# Patient Record
(demographics unavailable — no encounter records)

---

## 2024-10-08 NOTE — CONSULT LETTER
[FreeTextEntry1] : Dear Dr. Squires,   I had the pleasure of seeing Audra today. Below is my note regarding the office visit today.    Thank you so very much for allowing me to participate in Audra's care. Please don't hesitate to call me should any questions or issues arise .  Sincerely, Rohit Bonilla MD, FACS, FSPU Chief, Pediatric Urology Professor of Urology and Pediatrics [facility] [employer] President, American U[employer] Association - [place] Section Past-President, Societies for Pediatric Urology

## 2024-10-08 NOTE — DATA REVIEWED
[FreeTextEntry1] : ACC: [id number]     EXAM:  XR KUB 1 VIEW   ORDERED BY: DUDLEY LEWIS  PROCEDURE DATE:  09/13/2024  INTERPRETATION:  CLINICAL INFORMATION: Constipation  EXAM: Single frontal view of the abdomen.  COMPARISON: No available imaging for comparison.  FINDINGS: Nonobstructive bowel gas pattern. Moderate colonic stool burden There is no evidence of intraperitoneal free air. The visualized osseous structures demonstrate no acute pathology.  IMPRESSION: Nonobstructive bowel gas pattern with moderate colonic stool burden.  --- End of Report ---  TORRES HOLDER MD; Resident Radiologist This document has been electronically signed. CLIFFORD CAUSEY MD; Attending Radiologist This document has been electronically signed. Sep 13 2024  6:53AM

## 2024-10-08 NOTE — PHYSICAL EXAM
[1] : 1 [Labial adhesions] : no labial adhesions [Introital erythema] : no introital erythema [TextBox_92] : Parents served as chaperone for exam. Small urethral prolapse present on exam, otherwise no masses.

## 2024-10-08 NOTE — DATA REVIEWED
[FreeTextEntry1] : ACC: [id number]     EXAM:  XR KUB 1 VIEW   ORDERED BY: DUDLEY LEWIS  PROCEDURE DATE:  09/13/2024  INTERPRETATION:  CLINICAL INFORMATION: Constipation  EXAM: Single frontal view of the abdomen.  COMPARISON: No available imaging for comparison.  FINDINGS: Nonobstructive bowel gas pattern. Moderate colonic stool burden There is no evidence of intraperitoneal free air. The visualized osseous structures demonstrate no acute pathology.  IMPRESSION: Nonobstructive bowel gas pattern with moderate colonic stool burden.  --- End of Report ---  TORRES HOLDER MD; Resident Radiologist This document has been electronically signed. CLIFFORD CAUSEY MD; Attending Radiologist This document has been electronically signed. Sep 13 2024  6:53AM

## 2024-10-08 NOTE — CONSULT LETTER
[FreeTextEntry1] : Dear Dr. Squires,   I had the pleasure of seeing Audra today. Below is my note regarding the office visit today.    Thank you so very much for allowing me to participate in Audra's care. Please don't hesitate to call me should any questions or issues arise .  Sincerely, Rohit Bonilla MD, FACS, FSPU Chief, Pediatric Urology Professor of Urology and Pediatrics [facility] [employer] President, American [employer] Association - [place] Section Past-President, Societies for Pediatric Urology

## 2024-10-08 NOTE — HISTORY OF PRESENT ILLNESS
[TextBox_4] : Audra is a 2 y.o. female with h/o reactive airway disease being seen today for hospital follow up for urethral prolapse. [place] [facility] [place] and a urethral bulge per mom on 9/12/2024.  KUB at that time was normal. RVP (+) for HMPV. Diagnosed with urethral bulge. Was discharged home on Keflex prophylaxis and premarin. Also seen by outside urologist per parental report, records not available for review, and was prescribed an additional cream (unknown per parents). Since discharge, parents have been completing sitz baths and note that the bulge has gotten significant smaller and less bothersome. Denies h/o UTI or interval infections. No other voiding complaints.  Early development, Audra was born FT without complications. Discharged home with parents. Met all early developmental milestones. H/o RAD. No other significant pmh. Family history significant for maternal grandmother with T1DM.

## 2024-10-08 NOTE — ASSESSMENT
[FreeTextEntry1] : Audra has a small urethral prolapse that is improving with daily sitz baths. To continue with sitz baths BID and will follow up for reexam in one month. Parents expressed understanding ad all questions answered to parents satisfaction.

## 2024-10-08 NOTE — HISTORY OF PRESENT ILLNESS
[TextBox_4] : Audra is a 2 y.o. female with h/o reactive airway disease being seen today for hospital follow up for urethral prolapse. [place] [facility] [place] and a urethral bulge per mom on 9/12/2024.  KUB at that time was normal. RVP (+) for HMPV. Diagnosed with urethral bulge. Was discharged home on Keflex prophylaxis and premarin. Also seen by outside urologist per parental report, records not available for review, and was prescribed an additional cream (unknown per parents). Since discharge, parents have been completing sitz baths and note that the bulge has gotten significant smaller and less bothersome. Denies h/o UTI or interval infections. No other voiding complaints.  Early development, Audra was born FT without complications. Discharged home with parents. Met all early developmental milestones. H/o RAD. No other significant pmh. Family history significant for maternal grandmother with T1DM.

## 2024-10-08 NOTE — REASON FOR VISIT
[Initial Consultation] : an initial consultation [Parents] : parents [TextBox_50] : urethral prolapse

## 2024-11-11 NOTE — PHYSICAL EXAM
[Labial adhesions] : no labial adhesions [Introital erythema] : no introital erythema [1] : 1 [TextBox_92] : Mother served as chaperone for exam. Small urethral prolapse, otherwise no masses.

## 2024-11-11 NOTE — CONSULT LETTER
[FreeTextEntry1] : Dear Dr. Winkler,   I had the pleasure of seeing Audra for follow up today. Below is my note regarding the office visit today.    Thank you so very much for allowing me to participate in Audra's care. Please don't hesitate to call me should any questions or issues arise .  Sincerely, Rohit Bonilla MD, FACS, [facility] Chief, Pediatric Urology Professor of Urology and Pediatrics [facility] [employer] President, American U[employer] Association - [place] Section Past-President, Societies for Pediatric Urology

## 2024-11-11 NOTE — CONSULT LETTER
[FreeTextEntry1] : Dear Dr. Winkler,   I had the pleasure of seeing Audra for follow up today. Below is my note regarding the office visit today.    Thank you so very much for allowing me to participate in Audra's care. Please don't hesitate to call me should any questions or issues arise .  Sincerely, Rohit Bonilla MD, FACS, [facility] Chief, Pediatric Urology Professor of Urology and Pediatrics [facility] [employer] President, American U[employer] Association - [place] Section Past-President, Societies for Pediatric Urology

## 2024-11-11 NOTE — HISTORY OF PRESENT ILLNESS
[TextBox_4] : Audra is a 2 y.o. female with h/o reactive airway disease being seen today for follow up for urethral prolapse. [place] [facility] [place] with hematuria and a urethral bulge per mom on 9/12/2024. KUB at that time was normal. RVP (+) for HMPV. Diagnosed with urethral prolapse. Was discharged home on Keflex prophylaxis and premarin and triamcinolone.  Also seen by outside urologist per parental report, records not available for review. Last seen in office 10/8/2024 where BID sitz baths were recommended with 4 week follow up. No change since last follow up. No UTI, interval infections or other voiding concerns.

## 2024-11-11 NOTE — HISTORY OF PRESENT ILLNESS
[TextBox_4] : Audra is a 2 y.o. female with h/o reactive airway disease being seen today for follow up for urethral prolapse. Presented to [facility] [place] ED with hematuria and a urethral bulge per mom on 9/12/2024. KUB at that time was normal. RVP (+) for HMPV. Diagnosed with urethral prolapse. Was discharged home on Keflex prophylaxis and premarin and triamcinolone.  Also seen by outside urologist per parental report, records not available for review. Last seen in office 10/8/2024 where BID sitz baths were recommended with 4 week follow up. No change since last follow up. No UTI, interval infections or other voiding concerns.

## 2024-11-11 NOTE — ASSESSMENT
[FreeTextEntry1] : Audra has a small and unchanged urethral prolapse. No change to plan, will continue BID sitz baths for another month and follow up in office at that time for a progress check. Mother expressed understanding and all questions were answered.

## 2024-12-10 NOTE — PHYSICAL EXAM
[Tooth Eruption] : tooth eruption  [NL] : regular rate and rhythm, normal S1, S2 audible, no murmurs [Erythematous Oropharynx] : nonerythematous oropharynx [Enlarged Tonsils] : tonsils not enlarged [Exudate] : no exudate [Ulcerative Lesions] : no ulcerative lesions [Palate petechiae] : palate without petechiae [de-identified] : No mouth sores  [de-identified] : healing closed laceration of left middle finger

## 2024-12-10 NOTE — DISCUSSION/SUMMARY
[FreeTextEntry1] : 1yo F presents for mouth sore seen by parents near right molar, now resolved. Physical exam with no concerning findings. No rashes on hands or feet. Possibly secondary to viral infection, now improved.   #Mouth sore  - resolved, no other rashes   #Finger laceration - Healing well - Discussed leaving open to air rather than covering with Bandaids to promote further healing

## 2024-12-10 NOTE — HISTORY OF PRESENT ILLNESS
[EENT/Resp Symptoms] : EENT/RESPIRATORY SYMPTOMS [___ Day(s)] : [unfilled] day(s) [Max Temp: ____] : Max temperature: [unfilled] [de-identified] : cut inside mouth  [FreeTextEntry6] : 1yo F presents with mouth sore. Father states that mother had seen a sore near the right bottom molar over the weekend. Audra was not eating as much solid food, which parents attributed to sore. No fevers, vomiting, diarrhea or rash elsewhere. Today, she is eating and drinking as she typically does. No recent travel. No history of mouth sores in the family.   Father additionally states she caught her left middle finger in an elevator door one month ago. She received two absorbable stitches, which have now dissolved. They have been covering her finger with bandaids.

## 2025-01-23 NOTE — HISTORY OF PRESENT ILLNESS
[Normal] : Normal [No] : No cigarette smoke exposure [Water heater temperature set at <120 degrees F] : Water heater temperature set at <120 degrees F [Car seat in back seat] : Car seat in back seat [Smoke Detectors] : Smoke detectors [Carbon Monoxide Detectors] : Carbon monoxide detectors [NO] : No [At risk for exposure to TB] : Not at risk for exposure to Tuberculosis [FreeTextEntry1] : 2 years old well visit

## 2025-01-23 NOTE — PHYSICAL EXAM

## 2025-01-23 NOTE — DISCUSSION/SUMMARY
[Normal Growth] : growth [Normal Development] : development [None] : No known medical problems [No Elimination Concerns] : elimination [No Feeding Concerns] : feeding [No Skin Concerns] : skin [Normal Sleep Pattern] : sleep [No Medications] : ~He/She~ is not on any medications [Parent/Guardian] : parent/guardian [] : The components of the vaccine(s) to be administered today are listed in the plan of care. The disease(s) for which the vaccine(s) are intended to prevent and the risks have been discussed with the caretaker.  The risks are also included in the appropriate vaccination information statements which have been provided to the patient's caregiver.  The caregiver has given consent to vaccinate. [FreeTextEntry1] : Well 2 year old Growth and development: normal M-CHAT: normal Discussed safety/anticipatory guidance. Lead risk assessed: Discussed need for vaccines, reviewed side effects and VIS Next PE age 3 years  CBC AND LEAD LEVEL ORDERED  Continue cow's milk. Continue table foods, 3 meals with 2-3 snacks per day. Incorporate fluorinated water daily in a sippy cup. Brush teeth twice a day with soft toothbrush. Recommend visit to dentist. When in car, keep child in rear-facing car seats until age 2, or until  the maximum height and weight for seat is reached. Put toddler to sleep in own bed. Help toddler to maintain consistent daily routines and sleep schedule. Toilet training discussed. Ensure home is safe. Use consistent, positive discipline. Read aloud to toddler. Limit screen time to no more than 2 hours per day.

## 2025-02-18 NOTE — DISCUSSION/SUMMARY
[FreeTextEntry1] : 2 year old w/ ringworm. apply antifungal cream BID until resolved  Separately w/ likely irritant dermatitis on rest of torso.  - Apply HC ointment to rough areas BID prn roughness or erythema. Can supplement w/ calamine lotion prn - If new or worsening symptoms or parental concern - return to office or ED.

## 2025-02-18 NOTE — PHYSICAL EXAM
[NL] : warm, clear [de-identified] : large patch w/ central clearing on left abdominal region. separately w/ erythematous papules throughout torso

## 2025-02-18 NOTE — HISTORY OF PRESENT ILLNESS
[de-identified] : RASH,ON STOMACH,AND THE SIDE OF STOMACH AREA [FreeTextEntry6] : concern for worsening ringworm on side  separately, recently w/ new onset rash on torso. itchy. no fevers sore throat or other concerns. some new undershirts, but otherwise no new products or other potential triggers. used benadryl w/ minimal improvement